# Patient Record
Sex: MALE | Race: OTHER | NOT HISPANIC OR LATINO | Employment: FULL TIME | URBAN - METROPOLITAN AREA
[De-identification: names, ages, dates, MRNs, and addresses within clinical notes are randomized per-mention and may not be internally consistent; named-entity substitution may affect disease eponyms.]

---

## 2021-01-26 ENCOUNTER — OFFICE VISIT (OUTPATIENT)
Dept: OBGYN CLINIC | Facility: CLINIC | Age: 36
End: 2021-01-26
Payer: COMMERCIAL

## 2021-01-26 VITALS
DIASTOLIC BLOOD PRESSURE: 77 MMHG | BODY MASS INDEX: 28.6 KG/M2 | WEIGHT: 230 LBS | HEART RATE: 63 BPM | HEIGHT: 75 IN | SYSTOLIC BLOOD PRESSURE: 131 MMHG

## 2021-01-26 DIAGNOSIS — M24.812 INTERNAL DERANGEMENT OF LEFT SHOULDER: Primary | ICD-10-CM

## 2021-01-26 PROCEDURE — 99203 OFFICE O/P NEW LOW 30 MIN: CPT | Performed by: ORTHOPAEDIC SURGERY

## 2021-01-26 NOTE — PROGRESS NOTES
Patient Name:  Hilaria Brown  MRN:  [id number]    Assessment & Plan     Left shoulder discomfort after dislocation 9/21/20  Possible SLAP tear  1  Referral to physical therapy  2  Activities as tolerated with modification avoid pain  3  Briefly discussed MR arthrogram left shoulder if pain persists after 4-6 weeks of physical therapy  Patient may contact the office and schedule MRI over the phone  Chief Complaint     Left Shoulder pain  History of the Present Illness     Aroldo Bond is a 37 y o  male who reports to the office today for initial evaluation his left shoulder  Patient dislocated his left shoulder on 9/21/20 after he fell down steps  The shoulder was close reduced in [place] where he was living in an urgent care  He was advised to follow up with Orthopedics however he states he felt pretty good  He still notes mild persistent soreness and weakness in the shoulder  Soreness localized primarily to the lateral aspect of the shoulder  It is worse with reaching behind his back  He denies any instability  He takes nothing for pain  No numbness or tingling  No fevers or chills  Physical Exam     /77   Pulse 63   Ht 6' 3" (1 905 m)   Wt 104 kg (230 lb)   BMI 28 75 kg/m²      Left shoulder: No tenderness to palpation  PROM is , ER-abd 90, IR-abd 70  Neer impingement sign is positive  Lino impingement sign is negative  Negative empty can test  Positive Speed's test  Positive cross body adduction test  Positive Windsor's test     Eyes:  Anicteric sclerae  Neck:  Supple  Lungs:  Normal respiratory effort  Cardiovascular:  Capillary refill is less than 2 seconds  Skin:  Intact without erythema  Neurologic:  Sensation grossly intact to light touch  Psychiatric:  Mood and affect are appropriate  History reviewed  No pertinent past medical history  History reviewed  No pertinent surgical history      No Known Allergies    No current outpatient medications on file prior to visit  No current facility-administered medications on file prior to visit  Social History     Tobacco Use    Smoking status: Never Smoker    Smokeless tobacco: Never Used   Substance Use Topics    Alcohol use: Not on file    Drug use: Not on file       History reviewed  No pertinent family history  Review of Systems     As stated in the HPI  All other systems were reviewed and are negative        Scribe Attestation    I,:  Randal Nelson PA-C am acting as a scribe while in the presence of the attending physician :       I,:  Timothy Bucio MD personally performed the services described in this documentation    as scribed in my presence :

## 2021-02-01 ENCOUNTER — APPOINTMENT (OUTPATIENT)
Dept: PHYSICAL THERAPY | Facility: CLINIC | Age: 36
End: 2021-02-01
Payer: COMMERCIAL

## 2021-02-04 ENCOUNTER — EVALUATION (OUTPATIENT)
Dept: PHYSICAL THERAPY | Facility: CLINIC | Age: 36
End: 2021-02-04
Payer: COMMERCIAL

## 2021-02-04 DIAGNOSIS — M24.812 INTERNAL DERANGEMENT OF SHOULDER, LEFT: Primary | ICD-10-CM

## 2021-02-04 PROCEDURE — 97161 PT EVAL LOW COMPLEX 20 MIN: CPT | Performed by: PHYSICAL THERAPIST

## 2021-02-04 NOTE — PROGRESS NOTES
PT Evaluation     Today's date: 2021  Patient name: Cassius Butterfield  : 1985  MRN: [id number]  Referring provider: Frannie Huerta PA-C  Dx: No diagnosis found  Assessment  Assessment details: Aroldo Asafpresents with signs and symptoms consistent with Internal derangement of shoulder, left  (primary encounter diagnosis), with loss of range of motion, strength and spinal stabilization  Presents with high reactivity  Cassius Butterfield would benefit with physical therapy to address these impairments to return to prior level of function  Impairments: abnormal or restricted ROM, activity intolerance, impaired physical strength, lacks appropriate home exercise program and pain with function  Understanding of Dx/Px/POC: good   Prognosis: good    Goals  STG  Initiate HEP  Restore full AROM in 3 weeks  LTG  Independent with HEP  Able to lift 10 lbs overhead in 6 weeks  FOTO > 65 in 6 weeks      Plan  Planned therapy interventions: joint mobilization, manual therapy, neuromuscular re-education, strengthening, stretching, therapeutic exercise and home exercise program  Frequency: 2x week  Duration in visits: 12  Duration in weeks: 6  Treatment plan discussed with: patient        Subjective Evaluation    History of Present Illness  Mechanism of injury: Patient reports falling down a flight of stairs about 10/1/20 and dislocating his left shoulder  He went to the ER and was relocated in 2-3 hours              Not a recurrent problem   Pain  Current pain ratin  At best pain ratin  At worst pain rating: 3  Location: left shoulder  Quality: dull ache  Relieving factors: rest  Aggravating factors: lifting and overhead activity    Treatments  Current treatment: physical therapy  Patient Goals  Patient goals for therapy: decreased pain, increased motion, increased strength, independence with ADLs/IADLs and return to sport/leisure activities          Objective     Active Range of Motion   Left Shoulder Flexion: 175 degrees   Abduction: 170 degrees   External rotation 0°: 70 degrees   Internal rotation 0°: 65 degrees     Right Shoulder   Flexion: 180 degrees   Abduction: 180 degrees   External rotation 0°: 80 degrees   Internal rotation 0°: 70 degrees     Strength/Myotome Testing     Left Shoulder     Planes of Motion   Flexion: 3+   Abduction: 3+   External rotation at 0°: 3+   Internal rotation at 0°: 4     Right Shoulder     Planes of Motion   Flexion: 5   Abduction: 5   External rotation at 0°: 5   Internal rotation at 0°: 5       Flowsheet Rows      Most Recent Value   PT/OT G-Codes   Current Score  21   Projected Score  65             Precautions: Left sh dislocation      Manuals                                                                 Neuro Re-Ed                                                                                                        Ther Ex                                                                                                                     Ther Activity                                       Gait Training                                       Modalities

## 2021-02-08 ENCOUNTER — OFFICE VISIT (OUTPATIENT)
Dept: PHYSICAL THERAPY | Facility: CLINIC | Age: 36
End: 2021-02-08
Payer: COMMERCIAL

## 2021-02-08 DIAGNOSIS — M24.812 INTERNAL DERANGEMENT OF SHOULDER, LEFT: Primary | ICD-10-CM

## 2021-02-08 PROCEDURE — 97112 NEUROMUSCULAR REEDUCATION: CPT | Performed by: PHYSICAL THERAPIST

## 2021-02-08 PROCEDURE — 97110 THERAPEUTIC EXERCISES: CPT | Performed by: PHYSICAL THERAPIST

## 2021-02-08 NOTE — PROGRESS NOTES
Daily Note     Today's date: 2021  Patient name: Itz Antoine  : 1985  MRN: [id number]  Referring provider: Avel Sims PA-C  Dx:   Encounter Diagnosis     ICD-10-CM    1  Internal derangement of shoulder, left  M24 812                   Subjective: My left shoulder is sore from new exercise  Objective: See treatment diary below      Assessment: Tolerated treatment well  Patient demonstrated fatigue post treatment and exhibited good technique with therapeutic exercises      Plan: Continue per plan of care        Precautions: Left sh dislocation      Manuals                                                                 Neuro Re-Ed             Scapular stab w ball on wall 2x20            Neurac Scap stab w kneel 2x5            Neurac sh flex kneel 2x5                                                                Ther Ex             TB reese Blue 20x            TB sh Ext Blue 20x            TB push outs Blue 20x                                                                             Ther Activity                                       Gait Training                                       Modalities

## 2021-02-09 ENCOUNTER — APPOINTMENT (OUTPATIENT)
Dept: PHYSICAL THERAPY | Facility: CLINIC | Age: 36
End: 2021-02-09
Payer: COMMERCIAL

## 2021-02-11 ENCOUNTER — OFFICE VISIT (OUTPATIENT)
Dept: PHYSICAL THERAPY | Facility: CLINIC | Age: 36
End: 2021-02-11
Payer: COMMERCIAL

## 2021-02-11 DIAGNOSIS — M24.812 INTERNAL DERANGEMENT OF SHOULDER, LEFT: Primary | ICD-10-CM

## 2021-02-11 PROCEDURE — 97110 THERAPEUTIC EXERCISES: CPT | Performed by: PHYSICAL THERAPIST

## 2021-02-11 PROCEDURE — 97112 NEUROMUSCULAR REEDUCATION: CPT | Performed by: PHYSICAL THERAPIST

## 2021-02-11 NOTE — PROGRESS NOTES
Daily Note     Today's date: 2021  Patient name: Madiha Kim  : 1985  MRN: [id number]  Referring provider: Lucio Weber PA-C  Dx:   Encounter Diagnosis     ICD-10-CM    1  Internal derangement of shoulder, left  M24 812                   Subjective: My shoulder is feeling stronger    Objective: See treatment diary below      Assessment: Tolerated treatment well  Patient demonstrated fatigue post treatment and exhibited good technique with therapeutic exercises      Plan: Continue per plan of care        Precautions: Left sh dislocation      Manuals                                                                 Neuro Re-Ed            Scapular stab w ball on wall 2x20 20x bosu           Neurac Scap stab w kneel 2x5 2x5           Neurac sh flex kneel 2x5 2x5                                                               Ther Ex            TB reese Blue 20x 20x           TB sh Ext Blue 20x 20x           TB push outs Blue 20x 20x           blanca lo to hi  #8 20x           [place] hi to lo  #10 20x                                                  Ther Activity                                       Gait Training                                       Modalities

## 2021-02-16 ENCOUNTER — OFFICE VISIT (OUTPATIENT)
Dept: PHYSICAL THERAPY | Facility: CLINIC | Age: 36
End: 2021-02-16
Payer: COMMERCIAL

## 2021-02-16 DIAGNOSIS — M24.812 INTERNAL DERANGEMENT OF SHOULDER, LEFT: Primary | ICD-10-CM

## 2021-02-16 PROCEDURE — 97110 THERAPEUTIC EXERCISES: CPT | Performed by: PHYSICAL THERAPIST

## 2021-02-16 PROCEDURE — 97112 NEUROMUSCULAR REEDUCATION: CPT | Performed by: PHYSICAL THERAPIST

## 2021-02-16 NOTE — PROGRESS NOTES
Daily Note     Today's date: 2021  Patient name: Itz Antoine  : 1985  MRN: [id number]  Referring provider: Avel Sims PA-C  Dx:   Encounter Diagnosis     ICD-10-CM    1  Internal derangement of shoulder, left  M24 812                   Subjective: My shoulder is feeling stronger  Objective: See treatment diary below      Assessment: Tolerated treatment well  Patient demonstrated fatigue post treatment and exhibited good technique with therapeutic exercises      Plan: Continue per plan of care        Precautions: Left sh dislocation      Manuals                                                                 Neuro Re-Ed           Scapular stab w ball on wall 2x20 20x bosu 20x          Neurac Scap stab w kneel 2x5 2x5 2x5          Neurac sh flex kneel 2x5 2x5 2x5                                                              Ther Ex           TB reese Blue 20x 20x 20x          TB sh Ext Blue 20x 20x 20x          TB push outs Blue 20x 20x 20x          rodriguez lo to hi  #8 20x 20x          [place] hi to lo  #10 20x 20x          tricep dip   2x5          Reverse pushup   2x5                       Ther Activity                                       Gait Training                                       Modalities KYLIE:'[id number]:[id number]'

## 2021-02-18 ENCOUNTER — APPOINTMENT (OUTPATIENT)
Dept: PHYSICAL THERAPY | Facility: CLINIC | Age: 36
End: 2021-02-18
Payer: COMMERCIAL

## 2021-02-19 ENCOUNTER — OFFICE VISIT (OUTPATIENT)
Dept: PHYSICAL THERAPY | Facility: CLINIC | Age: 36
End: 2021-02-19
Payer: COMMERCIAL

## 2021-02-19 DIAGNOSIS — M24.812 INTERNAL DERANGEMENT OF SHOULDER, LEFT: Primary | ICD-10-CM

## 2021-02-19 PROCEDURE — 97112 NEUROMUSCULAR REEDUCATION: CPT | Performed by: PHYSICAL THERAPIST

## 2021-02-19 PROCEDURE — 97110 THERAPEUTIC EXERCISES: CPT | Performed by: PHYSICAL THERAPIST

## 2021-02-19 NOTE — PROGRESS NOTES
Daily Note     Today's date: 2021  Patient name: Dwayne Reyes  : 1985  MRN: [id number]  Referring provider: Esthela Fowler PA-C  Dx:   Encounter Diagnosis     ICD-10-CM    1  Internal derangement of shoulder, left  M24 812                   Subjective: My shoulder is getting stronger  Objective: See treatment diary below      Assessment: Tolerated treatment well  Patient demonstrated fatigue post treatment and exhibited good technique with therapeutic exercises      Plan: Continue per plan of care        Precautions: Left sh dislocation      Manuals                                                                 Neuro Re-Ed          Scapular stab w ball on wall 2x20 20x bosu 20x 20x         Neurac Scap stab w kneel 2x5 2x5 2x5 2x5         Neurac sh flex kneel 2x5 2x5 2x5 2x5                                                             Ther Ex           TB reese Blue 20x 20x 20x 20x         TB sh Ext Blue 20x 20x 20x 20x         TB push outs Blue 20x 20x 20x 20x         rodriguez lo to hi  #8 20x 20x 20x         [place] hi to lo  #10 20x 20x 20x         tricep dip   2x5 2x7         Reverse pushup   2x5 2x7                      Ther Activity                                       Gait Training                                       Modalities

## 2021-02-22 ENCOUNTER — TELEPHONE (OUTPATIENT)
Dept: UROLOGY | Facility: MEDICAL CENTER | Age: 36
End: 2021-02-22

## 2021-02-22 NOTE — TELEPHONE ENCOUNTER
Npt calling with continual left testicular pain x 2 weeks no testing no medical treatment,please contact pt for time frame appointment

## 2021-02-22 NOTE — TELEPHONE ENCOUNTER
Complaint/Diagnosis:Left Testicular Pain    Insurance:[facility] PPO    History of Cancer:no    Previous urologist:no    Outside testing/where:no    If yes,what kind:no    Records requested/where:no    Preferred location:[place]

## 2021-02-23 ENCOUNTER — OFFICE VISIT (OUTPATIENT)
Dept: PHYSICAL THERAPY | Facility: CLINIC | Age: 36
End: 2021-02-23
Payer: COMMERCIAL

## 2021-02-23 DIAGNOSIS — M24.812 INTERNAL DERANGEMENT OF SHOULDER, LEFT: Primary | ICD-10-CM

## 2021-02-23 PROCEDURE — 97112 NEUROMUSCULAR REEDUCATION: CPT

## 2021-02-23 PROCEDURE — 97110 THERAPEUTIC EXERCISES: CPT

## 2021-02-23 NOTE — TELEPHONE ENCOUNTER
Called and spoke with patient at this time  He reports pain in left testicle times 2 weeks  No lumps noted, no fever or chills  He does stat he thinks his left side is a bit bigger than the right  He is unsure if it was always like this or if he is just noticing it now  Advised our next available appt is 3/31/21 at [facility]  He accepted this appt and it was scheduled and placed on wait list      Advised he see a PCP or urgent care in the meantime and have a scrotal US done  Patient was provided with the closest [facility] to him, their address and phone number  He reports he does have an appt with a [place] Urologist but would like to have care at [facility]  Advised he is free to choose who he sees for his care  Advised if there are concerning results on US done via [facility] they will likely consult us for a sooner appt  He reports he will go to a [facility] soon and have US done  He verbalized understanding of conversation

## 2021-02-23 NOTE — PROGRESS NOTES
Daily Note     Today's date: 2021  Patient name: Herminio Delarosa  : 1985  MRN: [id number]  Referring provider: Junior Riddle PA-C  Dx:   Encounter Diagnosis     ICD-10-CM    1  Internal derangement of shoulder, left  M24 812        Start Time: 1645          Subjective: Patient reports continued improvement with R shoulder  Objective: See treatment diary below      Assessment: Tolerated treatment well  Patient exhibited good technique with therapeutic exercises and would benefit from continued PT      Plan: Progress treatment as tolerated         Precautions: Left sh dislocation      Manuals                                                                 Neuro Re-Ed         Scapular stab w ball on wall 2x20 20x bosu 20x 20x 20x        Neurac Scap stab w kneel 2x5 2x5 2x5 2x5 ---        Neurac sh flex kneel 2x5 2x5 2x5 2x5 ---        scap stabs on inverted BOSU    --- 20x                                               Ther Ex           TB reese Blue 20x 20x 20x 20x 20x        TB sh Ext Blue 20x 20x 20x 20x 20x        TB push outs Blue 20x 20x 20x 20x 20x        rodriguez lo to hi  #8 20x 20x 20x ---        [place] hi to lo  #10 20x 20x 20x ---        tricep dip   2x5 2x7 2x7 reps         Reverse pushup   2x5 2x7 2x7 reps         TB ER     Gr tubing x 20 reps         Ther Activity                                       Gait Training                                       Modalities

## 2021-02-24 ENCOUNTER — OFFICE VISIT (OUTPATIENT)
Dept: URGENT CARE | Facility: CLINIC | Age: 36
End: 2021-02-24
Payer: COMMERCIAL

## 2021-02-24 VITALS
OXYGEN SATURATION: 98 % | HEART RATE: 67 BPM | WEIGHT: 233 LBS | SYSTOLIC BLOOD PRESSURE: 119 MMHG | BODY MASS INDEX: 29.9 KG/M2 | DIASTOLIC BLOOD PRESSURE: 74 MMHG | HEIGHT: 74 IN | TEMPERATURE: 97.5 F | RESPIRATION RATE: 18 BRPM

## 2021-02-24 DIAGNOSIS — N50.812 LEFT TESTICULAR PAIN: Primary | ICD-10-CM

## 2021-02-24 LAB
SL AMB  POCT GLUCOSE, UA: NEGATIVE
SL AMB LEUKOCYTE ESTERASE,UA: NEGATIVE
SL AMB POCT BILIRUBIN,UA: NEGATIVE
SL AMB POCT BLOOD,UA: NEGATIVE
SL AMB POCT CLARITY,UA: CLEAR
SL AMB POCT COLOR,UA: NORMAL
SL AMB POCT KETONES,UA: NEGATIVE
SL AMB POCT NITRITE,UA: NEGATIVE
SL AMB POCT PH,UA: 6.5
SL AMB POCT SPECIFIC GRAVITY,UA: 1.02
SL AMB POCT URINE PROTEIN: NEGATIVE
SL AMB POCT UROBILINOGEN: 0.2

## 2021-02-24 PROCEDURE — 87491 CHLMYD TRACH DNA AMP PROBE: CPT | Performed by: PHYSICIAN ASSISTANT

## 2021-02-24 PROCEDURE — 87591 N.GONORRHOEAE DNA AMP PROB: CPT | Performed by: PHYSICIAN ASSISTANT

## 2021-02-24 PROCEDURE — 81002 URINALYSIS NONAUTO W/O SCOPE: CPT | Performed by: PHYSICIAN ASSISTANT

## 2021-02-24 PROCEDURE — 99203 OFFICE O/P NEW LOW 30 MIN: CPT | Performed by: PHYSICIAN ASSISTANT

## 2021-02-24 NOTE — PATIENT INSTRUCTIONS
Apply ice to your scrotum for discomfort relief  Take ibuprofen for discomfort  Follow up with a urologist in 3-5 days  Proceed to the ER if symptoms worsen

## 2021-02-24 NOTE — PROGRESS NOTES
[facility]        NAME: Bautista Dominique is a 37 y o  male  : 1985    MRN: [id number]  DATE: 2021  TIME: 5:11 PM    Assessment and Plan   Left testicular pain [N50 812]  1  Left testicular pain  POCT urine dip    Chlamydia/GC amplified DNA by PCR     Patient Instructions     Apply ice to your scrotum for discomfort relief  Take ibuprofen for discomfort  Follow up with a urologist in 3-5 days  Proceed to the ER if symptoms worsen  Chief Complaint     Chief Complaint   Patient presents with    Testicle Pain     has appointment with Urology at [place] 3/31/21  has left testicular pain       History of Present Illness       36 y/o male with c/o left testicular pain x 1 week  Pain has been constant since its onset and gradually increasing in severity  It is described as an aching or heaviness  Pain is felt throughout the testicle but most prominent over posterior aspect  It does not radiate  No associated F/C/S, swelling, redness, or bruising  No known injury  No prior occurrence  Has not been sexually active in approximately 6 months and has been in a monogamous relationship with this partner x 2 years  No penile discharge, swelling, or pain  Denies urinary hesitancy, dysuria, frequency, urgency, or hematuria  Review of Systems   Review of Systems   Constitutional: Negative for chills, diaphoresis and fever  Gastrointestinal: Negative for abdominal pain, nausea and vomiting  Genitourinary: Positive for testicular pain  Negative for decreased urine volume, difficulty urinating, discharge, dysuria, flank pain, frequency, hematuria, penile pain, penile swelling, scrotal swelling and urgency  Current Medications     No current outpatient medications on file      Current Allergies     Allergies as of 2021    (No Known Allergies)            The following portions of the patient's history were reviewed and updated as appropriate: allergies, current medications, past family history, past medical history, past social history, past surgical history and problem list      History reviewed  No pertinent past medical history  History reviewed  No pertinent surgical history  History reviewed  No pertinent family history  Medications have been verified  Objective   /74   Pulse 67   Temp 97 5 °F (36 4 °C)   Resp 18   Ht 6' 2" (1 88 m)   Wt 106 kg (233 lb)   SpO2 98%   BMI 29 92 kg/m²   No LMP for male patient  Urine Dip:  Component 2/24/21 5:10 PM   LEUKOCYTE ESTERASE,UA Negative    NITRITE,UA Negative    SL AMB POCT UROBILINOGEN 0 2    POCT URINE PROTEIN Negative     PH,UA 6 5    BLOOD,UA Negative    SPECIFIC GRAVITY,UA 1 020    KETONES,UA Negative    BILIRUBIN,UA Negative    GLUCOSE, UA Negative     COLOR,UA Straw    CLARITY,UA Clear        Physical Exam     Physical Exam  Vitals signs and nursing note reviewed  Chaperone present: Chaperone declined  Constitutional:       General: He is not in acute distress  Appearance: He is well-developed  He is not ill-appearing or diaphoretic  HENT:      Head: Normocephalic and atraumatic  Eyes:      General: Lids are normal          Right eye: No discharge  Left eye: No discharge  Conjunctiva/sclera: Conjunctivae normal    Abdominal:      General: Abdomen is flat  Palpations: Abdomen is soft  Tenderness: There is no abdominal tenderness  Genitourinary:     Penis: Normal        Scrotum/Testes: Normal  Cremasteric reflex is present  Left: Mass, tenderness, swelling, testicular hydrocele or varicocele not present  Epididymis:      Left: Normal    Skin:     General: Skin is warm and dry  Neurological:      General: No focal deficit present  Mental Status: He is alert  He is not disoriented  Coordination: Coordination is intact  Gait: Gait normal    Psychiatric:         Attention and Perception: Attention normal          Behavior: Behavior is cooperative

## 2021-02-25 ENCOUNTER — OFFICE VISIT (OUTPATIENT)
Dept: PHYSICAL THERAPY | Facility: CLINIC | Age: 36
End: 2021-02-25
Payer: COMMERCIAL

## 2021-02-25 DIAGNOSIS — M24.812 INTERNAL DERANGEMENT OF SHOULDER, LEFT: Primary | ICD-10-CM

## 2021-02-25 LAB
C TRACH DNA SPEC QL NAA+PROBE: NEGATIVE
N GONORRHOEA DNA SPEC QL NAA+PROBE: NEGATIVE

## 2021-02-25 PROCEDURE — 97110 THERAPEUTIC EXERCISES: CPT | Performed by: PHYSICAL THERAPIST

## 2021-02-25 PROCEDURE — 97112 NEUROMUSCULAR REEDUCATION: CPT | Performed by: PHYSICAL THERAPIST

## 2021-02-25 NOTE — TELEPHONE ENCOUNTER
Please advise currently scheduled appt is appropriate  Can add to wait list  Should also request scrotal US from PCP or urgent care prior to appt with urology

## 2021-02-25 NOTE — TELEPHONE ENCOUNTER
Patient is calling back as went to urgent care and was told to see a urologist  He is calling back again to set up appointment as he called the facility in the first place

## 2021-02-25 NOTE — TELEPHONE ENCOUNTER
Per previous notes, appt already scheduled  This is unfortunately the next available new patient appt  Patient can be placed on wait list  Of note urgent care did not perform a scrotal US  GC/Chlamydia was negative  Please review urgent care notes in epic and advise if sooner appt warranted

## 2021-02-25 NOTE — TELEPHONE ENCOUNTER
Sounds okay if his symptoms get worse we can try to squeeze of min with knees at some point  Would be good if his family doctor could order an ultrasound

## 2021-02-25 NOTE — PROGRESS NOTES
Daily Note     Today's date: 2021  Patient name: Shayy Cohen  : 1985  MRN: [id number]  Referring provider: Raymon Rodney PA-C  Dx: No diagnosis found  Subjective: My shoulder feels stronger  Objective: See treatment diary below      Assessment: Tolerated treatment well  Patient demonstrated fatigue post treatment and exhibited good technique with therapeutic exercises      Plan: Continue per plan of care        Precautions: Left sh dislocation      Manuals                                                                 Neuro Re-Ed        Scapular stab w ball on wall 2x20 20x bosu 20x 20x 20x 20x       Neurac Scap stab w kneel 2x5 2x5 2x5 2x5 ---        Neurac sh flex kneel 2x5 2x5 2x5 2x5 ---        scap stabs on inverted BOSU    --- 20x 20x                                              Ther Ex           TB reese Blue 20x 20x 20x 20x 20x 20x       TB sh Ext Blue 20x 20x 20x 20x 20x 20x       TB push outs Blue 20x 20x 20x 20x 20x 20x       rodriguez lo to hi  #8 20x 20x 20x --- #12 20x       [place] hi to lo  #10 20x 20x 20x --- #12 20x       tricep dip   2x5 2x7 2x7 reps         Reverse pushup   2x5 2x7 2x7 reps         TB ER     Gr tubing x 20 reps  20x       Ther Activity                                       Gait Training                                       Modalities

## 2021-03-01 ENCOUNTER — OFFICE VISIT (OUTPATIENT)
Dept: URGENT CARE | Facility: CLINIC | Age: 36
End: 2021-03-01
Payer: COMMERCIAL

## 2021-03-01 VITALS
OXYGEN SATURATION: 100 % | RESPIRATION RATE: 18 BRPM | WEIGHT: 229 LBS | HEIGHT: 75 IN | SYSTOLIC BLOOD PRESSURE: 132 MMHG | TEMPERATURE: 97 F | BODY MASS INDEX: 28.47 KG/M2 | DIASTOLIC BLOOD PRESSURE: 80 MMHG | HEART RATE: 71 BPM

## 2021-03-01 DIAGNOSIS — R51.9 ACUTE NONINTRACTABLE HEADACHE, UNSPECIFIED HEADACHE TYPE: Primary | ICD-10-CM

## 2021-03-01 PROCEDURE — 3725F SCREEN DEPRESSION PERFORMED: CPT | Performed by: PHYSICIAN ASSISTANT

## 2021-03-01 PROCEDURE — 99213 OFFICE O/P EST LOW 20 MIN: CPT | Performed by: PHYSICIAN ASSISTANT

## 2021-03-01 NOTE — PROGRESS NOTES
[facility]      NAME: Herminio Delarosa is a 37 y o  male  : 1985    MRN: [id number]  DATE: 2021  TIME: 2:25 PM    Assessment and Plan   Acute nonintractable headache, unspecified headache type [R51 9]  1  Acute nonintractable headache, unspecified headache type       Patient Instructions   Headache likely from dehydration or mild concussion if anything  Increase fluids, ibuprofen as needed for headache  If symptoms do not improve or worsen to ER  Follow up with PCP in 3-5 days  Proceed to  ER if symptoms worsen  Chief Complaint     Chief Complaint   Patient presents with    Head Injury     PATIENT STATED THAT HE THAT HE WAS DRINKING THROUGH UP WOKE UP WITH SORENESS ON RIGHT SIDE OF THE HEAD HE DONT REMEMBER HITTING HIS HEAD  THIS HAPPENED FRIDAY NIGHT INTO SATURDAY NOW HE IS FEELING PRESSURE PAIN IN NECK         History of Present Illness       Aroldo is a 72-year-old male who presents to clinic complaining headache x3 days  States that Friday night he was drinking heavily when he threw and fell asleep on the bathroom floor when he woke up Saturday he had had pressure and pain on the right posterior side of his head  He states that pain is still present today  He states that the right back of his head behind ears also tender but he does not feel any lump or wound  He does not remember hitting his head however he was intoxicated  Denies any vision changes, photophobia, nausea, vomiting or balance problems currently  Review of Systems   Review of Systems   Constitutional: Negative  Eyes: Negative for photophobia and visual disturbance  Gastrointestinal: Negative for nausea and vomiting  Musculoskeletal: Negative for gait problem, neck pain and neck stiffness  Neurological: Positive for headaches  Negative for dizziness, seizures, speech difficulty and light-headedness  Current Medications     No current outpatient medications on file      Current Allergies     Allergies as of 03/01/2021    (No Known Allergies)            The following portions of the patient's history were reviewed and updated as appropriate: allergies, current medications, past family history, past medical history, past social history, past surgical history and problem list      History reviewed  No pertinent past medical history  History reviewed  No pertinent surgical history  History reviewed  No pertinent family history  Medications have been verified  Objective   /80 (BP Location: Right arm, Patient Position: Sitting, Cuff Size: Standard)   Pulse 71   Temp (!) 97 °F (36 1 °C) (Tympanic)   Resp 18   Ht 6' 3" (1 905 m)   Wt 104 kg (229 lb)   SpO2 100%   BMI 28 62 kg/m²   No LMP for male patient  Physical Exam     Physical Exam  Vitals signs and nursing note reviewed  Constitutional:       General: He is not in acute distress  Appearance: Normal appearance  He is not ill-appearing  HENT:      Head: Normocephalic and atraumatic  No raccoon eyes, Powers's sign, abrasion, contusion or laceration  Right Ear: Hearing, tympanic membrane, ear canal and external ear normal       Left Ear: Hearing, tympanic membrane, ear canal and external ear normal    Eyes:      Extraocular Movements: Extraocular movements intact  Conjunctiva/sclera: Conjunctivae normal       Pupils: Pupils are equal, round, and reactive to light  Cardiovascular:      Rate and Rhythm: Normal rate and regular rhythm  Heart sounds: Normal heart sounds  Pulmonary:      Effort: Pulmonary effort is normal       Breath sounds: Normal breath sounds  Neurological:      Mental Status: He is alert and oriented to person, place, and time     Psychiatric:         Mood and Affect: Mood normal          Behavior: Behavior normal

## 2021-03-02 ENCOUNTER — OFFICE VISIT (OUTPATIENT)
Dept: FAMILY MEDICINE CLINIC | Facility: CLINIC | Age: 36
End: 2021-03-02
Payer: COMMERCIAL

## 2021-03-02 ENCOUNTER — OFFICE VISIT (OUTPATIENT)
Dept: PHYSICAL THERAPY | Facility: CLINIC | Age: 36
End: 2021-03-02
Payer: COMMERCIAL

## 2021-03-02 VITALS
HEART RATE: 72 BPM | TEMPERATURE: 97.9 F | BODY MASS INDEX: 29.39 KG/M2 | WEIGHT: 229 LBS | RESPIRATION RATE: 16 BRPM | OXYGEN SATURATION: 98 % | DIASTOLIC BLOOD PRESSURE: 70 MMHG | SYSTOLIC BLOOD PRESSURE: 120 MMHG | HEIGHT: 74 IN

## 2021-03-02 DIAGNOSIS — Z13.1 SCREENING FOR DIABETES MELLITUS: ICD-10-CM

## 2021-03-02 DIAGNOSIS — N50.812 LEFT TESTICULAR PAIN: Primary | ICD-10-CM

## 2021-03-02 DIAGNOSIS — Z13.0 SCREENING FOR DEFICIENCY ANEMIA: ICD-10-CM

## 2021-03-02 DIAGNOSIS — Z13.6 SCREENING FOR CARDIOVASCULAR CONDITION: ICD-10-CM

## 2021-03-02 DIAGNOSIS — M24.812 INTERNAL DERANGEMENT OF SHOULDER, LEFT: Primary | ICD-10-CM

## 2021-03-02 DIAGNOSIS — Z11.4 SCREENING FOR HIV (HUMAN IMMUNODEFICIENCY VIRUS): ICD-10-CM

## 2021-03-02 DIAGNOSIS — N26.1 ATROPHY OF RIGHT KIDNEY: ICD-10-CM

## 2021-03-02 PROCEDURE — 99214 OFFICE O/P EST MOD 30 MIN: CPT | Performed by: NURSE PRACTITIONER

## 2021-03-02 PROCEDURE — 97110 THERAPEUTIC EXERCISES: CPT

## 2021-03-02 PROCEDURE — 97112 NEUROMUSCULAR REEDUCATION: CPT

## 2021-03-02 NOTE — PROGRESS NOTES
Assessment/Plan:    1  Left testicular pain  -     US scrotum and testicles; Future; Expected date: 03/02/2021    2  Atrophy of right kidney    3  Screening for cardiovascular condition  -     Lipid Panel with Direct LDL reflex; Future    4  Screening for diabetes mellitus  -     Comprehensive metabolic panel; Future    5  Screening for deficiency anemia  -     CBC; Future    6  Screening for HIV (human immunodeficiency virus)  -     [facility]- Human Immunodeficiency Virus 1/2 Antigen / Antibody ( Fourth Generation) with Reflex Testing; Future          BMI Counseling: Body mass index is 29 8 kg/m²  Discussed the patient's BMI with him  The BMI is above normal  Nutrition recommendations include reducing portion sizes, decreasing overall calorie intake, 3-5 servings of fruits/vegetables daily, reducing fast food intake, consuming healthier snacks, decreasing soda and/or juice intake, moderation in carbohydrate intake, increasing intake of lean protein, reducing intake of saturated fat and trans fat and reducing intake of cholesterol  Patient Instructions:  Supportive care discussed and advised  Advised to RTO for any worsening and no improvement  Follow up for no improvement and worsening of conditions  Patient advised and educated when to see immediate medical care  Return in about 1 month (around 4/2/2021) for Annual physical       Future Appointments   Date Time Provider Bianca Orta   3/2/2021  5:00 PM [place], PTA [address] [place] [address]   3/4/2021  5:00 PM [place], PTA [address] [place] [address]   3/9/2021  5:00 PM [place], PTA [address] [place] [address]   3/10/2021  2:45 PM Ervin Deutsch PA-C [facility]   3/11/2021  5:00 PM [facility] ([place], [place]), [address] [place] [address]   3/16/2021  5:00 PM [place], PTA [address] [place] [address]   4/1/2021 11:30 AM WILLIE Velasquez [facility]           Subjective:      Patient ID: Kaye Dejesus is a 37 y o  male      Chief Complaint   Patient presents with    Follow-up     follow up post carenow visit due to scrotal pain, patient also requesting a referral for scrotal U/S , jloepzcma     Establish Care         Vitals:  /70   Pulse 72   Temp 97 9 °F (36 6 °C)   Resp 16   Ht 6' 1 5" (1 867 m)   Wt 104 kg (229 lb)   SpO2 98%   BMI 29 80 kg/m²     HPI    New to practice  Personal and family medical history reviewed  Denies any family and personal history of colon cancer, prostate cancer, breast cancer and pancreatic cancer  Patient stated that started with left testicular pain area about 2 weeks ago and stated that went to urgent care and did not find anything and advised to follow up with urologist  Patient stated that has appt scheduled with urologist on 03/10/2021 and needs ultrasound done before that appt and needs orders for that  Stated that pain is improving but not resolved yet  Denies any urinary symptoms  Denies any new sexual partners  Denies any falling, and heavy lifting  Denies any kind of strain  Denies any radiation of pain to groin or legs  In history stated that had abdominal MRI done in past and they found that his right kidney is smaller than left kidney and had seen nephrologist who advised him to get BMP every year but no other monitoring needed and stated that last time had blood work done more than year ago  The following portions of the patient's history were reviewed and updated as appropriate: allergies, current medications, past family history, past medical history, past social history, past surgical history and problem list       Review of Systems   Constitutional: Negative  HENT: Negative  Respiratory: Negative  Cardiovascular: Negative  Gastrointestinal: Negative  Genitourinary: Positive for testicular pain   Negative for difficulty urinating, discharge, dysuria, enuresis, flank pain, frequency, genital sores, hematuria, penile pain, penile swelling, scrotal swelling and urgency  Musculoskeletal: Negative  Skin: Negative  Neurological: Negative for dizziness and headaches  Objective:    Social History     Tobacco Use   Smoking Status Never Smoker   Smokeless Tobacco Never Used       Allergies: No Known Allergies      No current outpatient medications on file  No current facility-administered medications for this visit  Physical Exam  Exam conducted with a chaperone present Edson Angel)  Constitutional:       Appearance: Normal appearance  HENT:      Head: Normocephalic  Nose: Nose normal    Eyes:      Conjunctiva/sclera: Conjunctivae normal    Cardiovascular:      Rate and Rhythm: Normal rate and regular rhythm  Heart sounds: Normal heart sounds  Pulmonary:      Effort: Pulmonary effort is normal       Breath sounds: Normal breath sounds  Abdominal:      Hernia: There is no hernia in the left inguinal area or right inguinal area  Genitourinary:     Penis: Normal and circumcised  No tenderness, discharge, swelling or lesions  Scrotum/Testes:         Right: Mass, tenderness or swelling not present  Right testis is descended  Left: Mass, tenderness or swelling not present  Left testis is descended  Musculoskeletal: Normal range of motion  General: No swelling or tenderness  Lymphadenopathy:      Lower Body: No right inguinal adenopathy  No left inguinal adenopathy  Skin:     General: Skin is warm and dry  Findings: No rash  Neurological:      Mental Status: He is alert and oriented to person, place, and time  Psychiatric:         Mood and Affect: Mood normal          Behavior: Behavior normal          Thought Content:  Thought content normal          Judgment: Judgment normal                      WILLIE Guillermo

## 2021-03-02 NOTE — PROGRESS NOTES
Daily Note     Today's date: 3/2/2021  Patient name: Real Dear  : 1985  MRN: [id number]  Referring provider: Eva Gonsalez PA-C  Dx:   Encounter Diagnosis     ICD-10-CM    1  Internal derangement of shoulder, left  M24 812        Start Time: 1650          Subjective: My shoulder feels stronger  Objective: See treatment diary below      Assessment: Tolerated treatment well  Patient demonstrated fatigue post treatment, exhibited good technique with therapeutic exercises and would benefit from continued PT      Plan: Progress treatment as tolerated         Precautions: Left sh dislocation      Manuals                                                                 Neuro Re-Ed 2/8 2/11 2/16 2/19 2/23 2/25 3/2      Scapular stab w ball on wall 2x20 20x bosu 20x 20x 20x 20x 20/20      Neurac Scap stab w kneel 2x5 2x5 2x5 2x5 ---  --      Neurac sh flex kneel 2x5 2x5 2x5 2x5 ---  --      scap stabs on inverted BOSU    --- 20x 20x --                                             Ther Ex           TB reese Blue 20x 20x 20x 20x 20x 20x Red x 20 reps       TB sh Ext Blue 20x 20x 20x 20x 20x 20x Green TB x 20 reps       TB push outs Blue 20x 20x 20x 20x 20x 20x Red x 20 reps       rodriguez lo to hi  #8 20x 20x 20x --- #12 20x ---      [place] hi to lo  #10 20x 20x 20x --- #12 20x --      tricep dip   2x5 2x7 2x7 reps   keise tricep/biceps @#20 x 20 reps      Reverse pushup   2x5 2x7 2x7 reps   ---      TB ER     Gr tubing x 20 reps  20x Gr tubing x 20 reps       Ther Activity       5# bicep curls w/ scap retractions                                Gait Training                                       Modalities

## 2021-03-03 ENCOUNTER — HOSPITAL ENCOUNTER (OUTPATIENT)
Dept: RADIOLOGY | Facility: HOSPITAL | Age: 36
Discharge: HOME | End: 2021-03-03
Payer: COMMERCIAL

## 2021-03-03 DIAGNOSIS — N50.812 LEFT TESTICULAR PAIN: ICD-10-CM

## 2021-03-03 PROCEDURE — 76870 US EXAM SCROTUM: CPT

## 2021-03-04 ENCOUNTER — OFFICE VISIT (OUTPATIENT)
Dept: PHYSICAL THERAPY | Facility: CLINIC | Age: 36
End: 2021-03-04
Payer: COMMERCIAL

## 2021-03-04 DIAGNOSIS — M24.812 INTERNAL DERANGEMENT OF SHOULDER, LEFT: Primary | ICD-10-CM

## 2021-03-04 PROCEDURE — 97112 NEUROMUSCULAR REEDUCATION: CPT

## 2021-03-04 PROCEDURE — 97110 THERAPEUTIC EXERCISES: CPT

## 2021-03-04 NOTE — PROGRESS NOTES
Daily Note     Today's date: 3/4/2021  Patient name: Alexsander Shah  : 1985  MRN: [id number]  Referring provider: Akanksha Odom PA-C  Dx:   Encounter Diagnosis     ICD-10-CM    1  Internal derangement of shoulder, left  M24 812        Start Time: 1645          Subjective: I need a heavier band for exercises at home  Objective: See treatment diary below      Assessment: Tolerated treatment well  Patient exhibited good technique with therapeutic exercises and would benefit from continued PT  Provided patient with [employer] for HEP  Plan: Progress treatment as tolerated         Precautions: Left sh dislocation      Manuals                                                                 Neuro Re-Ed 2/8 2/11 2/16 2/19 2/23 2/25 3/4      Scapular stab w ball on wall 2x20 20x bosu 20x 20x 20x 20x 20/20      Neurac Scap stab w kneel 2x5 2x5 2x5 2x5 ---  --      Neurac sh flex kneel 2x5 2x5 2x5 2x5 ---  --      scap stabs on inverted BOSU    --- 20x 20x --                                             Ther Ex           TB reese Blue 20x 20x 20x 20x 20x 20x Red x 20 reps       TB sh Ext Blue 20x 20x 20x 20x 20x 20x Green TB x 20 reps       TB push outs Blue 20x 20x 20x 20x 20x 20x Red x 20 reps       blanca lo to hi  #8 20x 20x 20x --- #12 20x @#11 x 30 reps       Blanca hi to lo  #10 20x 20x 20x --- #12 20x @#10 x 30 reps       tricep dip   2x5 2x7 2x7 reps   keise tricep/biceps @#20 x 20 reps      Reverse pushup   2x5 2x7 2x7 reps   2x10 reps       TB ER     Gr tubing x 20 reps  20x Gr tubing x 20 reps       Ther Activity       5# bicep curls w/ scap retractions                                Gait Training                                       Modalities

## 2021-03-06 LAB
ALBUMIN SERPL-MCNC: 4.8 G/DL (ref 4–5)
ALBUMIN/GLOB SERPL: 2.3 {RATIO} (ref 1.2–2.2)
ALP SERPL-CCNC: 58 IU/L (ref 39–117)
ALT SERPL-CCNC: 33 IU/L (ref 0–44)
AST SERPL-CCNC: 24 IU/L (ref 0–40)
BASOPHILS # BLD AUTO: 0 X10E3/UL (ref 0–0.2)
BASOPHILS NFR BLD AUTO: 0 %
BILIRUB SERPL-MCNC: 0.3 MG/DL (ref 0–1.2)
BUN SERPL-MCNC: 15 MG/DL (ref 6–20)
BUN/CREAT SERPL: 16 (ref 9–20)
CALCIUM SERPL-MCNC: 9.1 MG/DL (ref 8.7–10.2)
CHLORIDE SERPL-SCNC: 106 MMOL/L (ref 96–106)
CHOLEST SERPL-MCNC: 160 MG/DL (ref 100–199)
CHOLEST/HDLC SERPL: 5 RATIO (ref 0–5)
CO2 SERPL-SCNC: 23 MMOL/L (ref 20–29)
CREAT SERPL-MCNC: 0.94 MG/DL (ref 0.76–1.27)
EOSINOPHIL # BLD AUTO: 0.1 X10E3/UL (ref 0–0.4)
EOSINOPHIL NFR BLD AUTO: 2 %
ERYTHROCYTE [DISTWIDTH] IN BLOOD BY AUTOMATED COUNT: 13.9 % (ref 11.6–15.4)
GLOBULIN SER-MCNC: 2.1 G/DL (ref 1.5–4.5)
GLUCOSE SERPL-MCNC: 87 MG/DL (ref 65–99)
HCT VFR BLD AUTO: 41.5 % (ref 37.5–51)
HDLC SERPL-MCNC: 32 MG/DL
HGB BLD-MCNC: 14.1 G/DL (ref 13–17.7)
HIV 1+2 AB+HIV1 P24 AG SERPL QL IA: NON REACTIVE
IMM GRANULOCYTES # BLD: 0 X10E3/UL (ref 0–0.1)
IMM GRANULOCYTES NFR BLD: 0 %
LDLC SERPL CALC-MCNC: 109 MG/DL (ref 0–99)
LYMPHOCYTES # BLD AUTO: 1.5 X10E3/UL (ref 0.7–3.1)
LYMPHOCYTES NFR BLD AUTO: 31 %
MCH RBC QN AUTO: 29.6 PG (ref 26.6–33)
MCHC RBC AUTO-ENTMCNC: 34 G/DL (ref 31.5–35.7)
MCV RBC AUTO: 87 FL (ref 79–97)
MICRODELETION SYND BLD/T FISH: NORMAL
MONOCYTES # BLD AUTO: 0.4 X10E3/UL (ref 0.1–0.9)
MONOCYTES NFR BLD AUTO: 9 %
NEUTROPHILS # BLD AUTO: 2.8 X10E3/UL (ref 1.4–7)
NEUTROPHILS NFR BLD AUTO: 58 %
PLATELET # BLD AUTO: 237 X10E3/UL (ref 150–450)
POTASSIUM SERPL-SCNC: 4.4 MMOL/L (ref 3.5–5.2)
PROT SERPL-MCNC: 6.9 G/DL (ref 6–8.5)
RBC # BLD AUTO: 4.77 X10E6/UL (ref 4.14–5.8)
SL AMB EGFR AFRICAN AMERICAN: 123 ML/MIN/1.73
SL AMB EGFR NON AFRICAN AMERICAN: 106 ML/MIN/1.73
SL AMB VLDL CHOLESTEROL CALC: 19 MG/DL (ref 5–40)
SODIUM SERPL-SCNC: 141 MMOL/L (ref 134–144)
TRIGL SERPL-MCNC: 101 MG/DL (ref 0–149)
WBC # BLD AUTO: 4.8 X10E3/UL (ref 3.4–10.8)

## 2021-03-09 ENCOUNTER — OFFICE VISIT (OUTPATIENT)
Dept: PHYSICAL THERAPY | Facility: CLINIC | Age: 36
End: 2021-03-09
Payer: COMMERCIAL

## 2021-03-09 DIAGNOSIS — M24.812 INTERNAL DERANGEMENT OF SHOULDER, LEFT: Primary | ICD-10-CM

## 2021-03-09 PROCEDURE — 97110 THERAPEUTIC EXERCISES: CPT

## 2021-03-09 PROCEDURE — 97112 NEUROMUSCULAR REEDUCATION: CPT

## 2021-03-09 NOTE — PROGRESS NOTES
Daily Note     Today's date: 3/9/2021  Patient name: Gladis Kauffman  : 1985  MRN: [id number]  Referring provider: Patsie Harada, PA-C  Dx:   Encounter Diagnosis     ICD-10-CM    1  Internal derangement of shoulder, left  M24 812        Start Time: 1645          Subjective: I had some shoulder pain after last visit  Objective: See treatment diary below      Assessment: Tolerated treatment fair  Patient exhibited good technique with therapeutic exercises and would benefit from continued PT  Advised patient to only perform 20 reps of exercises, but if painful to stop exercise  Plan: Progress treatment as tolerated         Precautions: Left sh dislocation      Manuals                                                                 Neuro Re-Ed 2/8 2/11 2/16 2/19 2/23 2/25 3/4 3/9     Scapular stab w ball on wall 2x20 20x bosu 20x 20x 20x 20x 20/20 20/20     Neurac Scap stab w kneel 2x5 2x5 2x5 2x5 ---  -- ---     Neurac sh flex kneel 2x5 2x5 2x5 2x5 ---  -- ---     scap stabs on inverted BOSU    --- 20x 20x -- ---                                            Ther Ex           TB reese Blue 20x 20x 20x 20x 20x 20x Red x 20 reps  Red x 20 reps      TB sh Ext Blue 20x 20x 20x 20x 20x 20x Green TB x 20 reps  Blue x 20 reps      TB push outs Blue 20x 20x 20x 20x 20x 20x Red x 20 reps  Red x 20 reps      blanca lo to hi  #8 20x 20x 20x --- #12 20x @#11 x 30 reps  ---     Blanca hi to lo  #10 20x 20x 20x --- #12 20x @#10 x 30 reps  ---     tricep dip   2x5 2x7 2x7 reps   keise tricep/biceps @#20 x 20 reps @#20 x 20 reps      Reverse pushup   2x5 2x7 2x7 reps   2x10 reps  ----     TB ER     Gr tubing x 20 reps  20x Gr tubing x 20 reps  Blue x 20 reps      Ther Activity       5# bicep curls w/ scap retractions 6# bicep curls w/ scap retractions x 20 reps      TG torso rotation (B)         L 10 x 20 reps      TG tricep dip         L 10 x 20 reps      Gait Training Modalities

## 2021-03-10 ENCOUNTER — OFFICE VISIT (OUTPATIENT)
Dept: UROLOGY | Facility: CLINIC | Age: 36
End: 2021-03-10
Payer: COMMERCIAL

## 2021-03-10 VITALS
WEIGHT: 230 LBS | BODY MASS INDEX: 31.15 KG/M2 | HEIGHT: 72 IN | HEART RATE: 70 BPM | SYSTOLIC BLOOD PRESSURE: 132 MMHG | DIASTOLIC BLOOD PRESSURE: 80 MMHG

## 2021-03-10 DIAGNOSIS — N50.812 PAIN IN LEFT TESTICLE: Primary | ICD-10-CM

## 2021-03-10 PROCEDURE — 1036F TOBACCO NON-USER: CPT | Performed by: PHYSICIAN ASSISTANT

## 2021-03-10 PROCEDURE — 99202 OFFICE O/P NEW SF 15 MIN: CPT | Performed by: PHYSICIAN ASSISTANT

## 2021-03-10 PROCEDURE — 3008F BODY MASS INDEX DOCD: CPT | Performed by: PHYSICIAN ASSISTANT

## 2021-03-11 ENCOUNTER — OFFICE VISIT (OUTPATIENT)
Dept: PHYSICAL THERAPY | Facility: CLINIC | Age: 36
End: 2021-03-11
Payer: COMMERCIAL

## 2021-03-11 DIAGNOSIS — M24.812 INTERNAL DERANGEMENT OF SHOULDER, LEFT: Primary | ICD-10-CM

## 2021-03-11 PROCEDURE — 97112 NEUROMUSCULAR REEDUCATION: CPT | Performed by: PHYSICAL THERAPIST

## 2021-03-11 PROCEDURE — 97110 THERAPEUTIC EXERCISES: CPT | Performed by: PHYSICAL THERAPIST

## 2021-03-11 NOTE — PROGRESS NOTES
Daily Note     Today's date: 3/11/2021  Patient name: Oren Romero  : 1985  MRN: [id number]  Referring provider: Kj Honeycutt PA-C  Dx:   Encounter Diagnosis     ICD-10-CM    1  Internal derangement of shoulder, left  M24 812                   Subjective: My shoulder feels great  Objective: See treatment diary below      Assessment: Tolerated treatment well  Patient demonstrated fatigue post treatment and exhibited good technique with therapeutic exercises      Plan: Continue per plan of care        Precautions: Left sh dislocation      Manuals                                                                 Neuro Re-Ed 2/8 2/11 2/16 2/19 2/23 2/25 3/4 3/9 3/11    Scapular stab w ball on wall 2x20 20x bosu 20x 20x 20x 20x 20/20 20/20 20/20    Neurac Scap stab w kneel 2x5 2x5 2x5 2x5 ---  -- ---     Neurac sh flex kneel 2x5 2x5 2x5 2x5 ---  -- ---     scap stabs on inverted BOSU    --- 20x 20x -- ---                                            Ther Ex           TB reese Blue 20x 20x 20x 20x 20x 20x Red x 20 reps  Red x 20 reps  20x    TB sh Ext Blue 20x 20x 20x 20x 20x 20x Green TB x 20 reps  Blue x 20 reps  20x    TB push outs Blue 20x 20x 20x 20x 20x 20x Red x 20 reps  Red x 20 reps  20x    blanca lo to hi  #8 20x 20x 20x --- #12 20x @#11 x 30 reps  --- #13 30x    Blanca hi to lo  #10 20x 20x 20x --- #12 20x @#10 x 30 reps  --- #11 30x    tricep dip   2x5 2x7 2x7 reps   keise tricep/biceps @#20 x 20 reps @#20 x 20 reps      Reverse pushup   2x5 2x7 2x7 reps   2x10 reps  ----     TB ER     Gr tubing x 20 reps  20x Gr tubing x 20 reps  Blue x 20 reps  20x    Ther Activity       5# bicep curls w/ scap retractions 6# bicep curls w/ scap retractions x 20 reps      TG torso rotation (B)         L 10 x 20 reps      TG tricep dip         L 10 x 20 reps      Gait Training                                       Modalities

## 2021-03-15 NOTE — PROGRESS NOTES
UROLOGY CONSULTATION NOTE     Patient Identifiers: Wellington Arriaza (MRN: [id number])  Service Requesting [address] [facility], [facility]   Service Providing Consultation:  Urology, Trev Fraga PA-C  Consults  Date of Service: 3/15/2021    Reason for Consultation:  Left testicular pain    History of Present Illness:     Wellington Arriaza is a 37 y o  male with no prior urologic problems is referred for evaluation of left orchalgia  His symptoms have almost 100% resolved at this point  He denies any direct trauma  Denies any swelling fever chills  He did has some tenderness early on which has now subsided  There is no testicular abnormality  He denies any family history of prostate bladder or kidney problems  ST eyes were checked and are negative  He was referred for evaluation of this problem  Past Medical, Past Surgical History:   History reviewed  No pertinent past medical history :    Past Surgical History:   Procedure Laterality Date    NO PAST SURGERIES     :    Medications, Allergies:   No current outpatient medications on file  Allergies:  No Known Allergies:    Social and Family History:   Social History:   Social History     Tobacco Use    Smoking status: Never Smoker    Smokeless tobacco: Never Used   Substance Use Topics    Alcohol use: Yes     Frequency: 2-4 times a month     Binge frequency: Less than monthly    Drug use: Never     Social History     Tobacco Use   Smoking Status Never Smoker   Smokeless Tobacco Never Used       Family History:  Family History   Problem Relation Age of Onset    No Known Problems Mother     No Known Problems Father    :     Review of Systems:     General: Fever, chills, or night sweats: negative  Cardiac: Negative for chest pain  Pulmonary: Negative for shortness of breath  Gastrointestinal: Abdominal pain negative  Nausea, vomiting, or diarrhea negative,  Genitourinary: See HPI above  Patient does not have hematuria    All other systems queried were negative  Physical Exam:   General: Patient is pleasant and in NAD  Awake and alert  /80   Pulse 70   Ht 6' (1 829 m)   Wt 104 kg (230 lb)   BMI 31 19 kg/m²   HEENT:  Conjunctiva are clear  Constitutional:  pleasant and cooperative     no apparent distress  Cardiac: Peripheral edema: negative  Pulmonary: Non-labored breathing  Abdomen: Soft, non-tender, non-distended  No surgical scars  No masses, tenderness, hernias noted  Genitourinary: Negative CVA tenderness, negative suprapubic tenderness  Extremities:  Neurological:CNII-XII intact  No numbness or tingling  Essentially non focal neurologic exam  Psychiatric:mood affect and behavior normal    (Male):  Normal  exam   Penis uncircumcised, phallus normal, meatus patent  Testicles descended into scrotum bilaterally without masses nor tenderness  No inguinal hernias bilaterally           Labs:     Lab Results   Component Value Date    HGB 14 1 03/05/2021    HCT 41 5 03/05/2021    WBC 4 8 03/05/2021     03/05/2021   ]    Lab Results   Component Value Date    K 4 4 03/05/2021     03/05/2021    CO2 23 03/05/2021    BUN 15 03/05/2021    CREATININE 0 94 03/05/2021   ]    Imaging:   I personally reviewed the images and report of the following studies, and reviewed them with the patient:    SCROTAL ULTRASOUND   IMPRESSION:     Testes appear symmetric and within normal limits  No suspicious or acute findings identified  ASSESSMENT:   1  Orchialgia      PLAN:   -all symptoms have resolved  -it is possible he had a incomplete intermittent torsion but there is no evidence of that  -I reviewed emergency room precautions with the patient  -he will call again if he has any recurrent symptoms      Thank you for allowing me to participate in this patients care  Please do not hesitate to call with any additional questions    Madie Adams PA-C

## 2021-03-16 ENCOUNTER — OFFICE VISIT (OUTPATIENT)
Dept: PHYSICAL THERAPY | Facility: CLINIC | Age: 36
End: 2021-03-16
Payer: COMMERCIAL

## 2021-03-16 DIAGNOSIS — M24.812 INTERNAL DERANGEMENT OF SHOULDER, LEFT: Primary | ICD-10-CM

## 2021-03-16 PROCEDURE — 97110 THERAPEUTIC EXERCISES: CPT

## 2021-03-16 PROCEDURE — 97112 NEUROMUSCULAR REEDUCATION: CPT

## 2021-03-16 NOTE — PROGRESS NOTES
Daily Note     Today's date: 3/16/2021  Patient name: Cassius Butterfield  : 1985  MRN: [id number]  Referring provider: Frannie Huerta PA-C  Dx:   Encounter Diagnosis     ICD-10-CM    1  Internal derangement of shoulder, left  M24 812        Start Time: 1640  Stop Time: 1710  Total time in clinic (min): 30 minutes    Subjective: My shoulder feels great  Objective: See treatment diary below      Assessment: Tolerated treatment well   Patient exhibited good technique with therapeutic exercises      Plan: D/C with HEP     Precautions: Left sh dislocation      Manuals                                                                 Neuro Re-Ed 2/8 2/11 2/16 2/19 2/23 2/25 3/4 3/9 3/16    Scapular stab w ball on wall 2x20 20x bosu 20x 20x 20x 20x 20/20 20/20 20/20    Neurac Scap stab w kneel 2x5 2x5 2x5 2x5 ---  -- --- ---    Neurac sh flex kneel 2x5 2x5 2x5 2x5 ---  -- --- ---    scap stabs on inverted BOSU    --- 20x 20x -- --- ---                                           Ther Ex           TB reese Blue 20x 20x 20x 20x 20x 20x Red x 20 reps  Red x 20 reps  red 20x    TB sh Ext Blue 20x 20x 20x 20x 20x 20x Green TB x 20 reps  Blue x 20 reps  blue 20x    TB push outs Blue 20x 20x 20x 20x 20x 20x Red x 20 reps  Red x 20 reps  red 20x    blanca lo to hi  #8 20x 20x 20x --- #12 20x @#11 x 30 reps  --- @#13 30x    Blanca hi to lo  #10 20x 20x 20x --- #12 20x @#10 x 30 reps  --- @#11 30x    tricep dip   2x5 2x7 2x7 reps   keise tricep/biceps @#20 x 20 reps @#20 x 20 reps  @#20 x 20 reps     Reverse pushup   2x5 2x7 2x7 reps   2x10 reps  ---- ---    TB ER     Gr tubing x 20 reps  20x Gr tubing x 20 reps  Blue x 20 reps  blue 20x    Ther Activity       5# bicep curls w/ scap retractions 6# bicep curls w/ scap retractions x 20 reps  ---    TG torso rotation (B)         L 10 x 20 reps  --    TG tricep dip         L 10 x 20 reps  ---    Gait Training                                       Modalities

## 2021-03-18 ENCOUNTER — APPOINTMENT (OUTPATIENT)
Dept: PHYSICAL THERAPY | Facility: CLINIC | Age: 36
End: 2021-03-18
Payer: COMMERCIAL

## 2021-04-13 ENCOUNTER — OFFICE VISIT (OUTPATIENT)
Dept: FAMILY MEDICINE CLINIC | Facility: CLINIC | Age: 36
End: 2021-04-13
Payer: COMMERCIAL

## 2021-04-13 VITALS
HEIGHT: 74 IN | RESPIRATION RATE: 18 BRPM | HEART RATE: 65 BPM | DIASTOLIC BLOOD PRESSURE: 72 MMHG | TEMPERATURE: 98 F | WEIGHT: 229 LBS | BODY MASS INDEX: 29.39 KG/M2 | OXYGEN SATURATION: 98 % | SYSTOLIC BLOOD PRESSURE: 122 MMHG

## 2021-04-13 DIAGNOSIS — E78.2 MIXED HYPERLIPIDEMIA: ICD-10-CM

## 2021-04-13 DIAGNOSIS — N26.1 ATROPHY OF RIGHT KIDNEY: ICD-10-CM

## 2021-04-13 DIAGNOSIS — Z00.00 ROUTINE ADULT HEALTH MAINTENANCE: Primary | ICD-10-CM

## 2021-04-13 PROCEDURE — 3008F BODY MASS INDEX DOCD: CPT | Performed by: NURSE PRACTITIONER

## 2021-04-13 PROCEDURE — 3725F SCREEN DEPRESSION PERFORMED: CPT | Performed by: NURSE PRACTITIONER

## 2021-04-13 PROCEDURE — 1036F TOBACCO NON-USER: CPT | Performed by: NURSE PRACTITIONER

## 2021-04-13 PROCEDURE — 99395 PREV VISIT EST AGE 18-39: CPT | Performed by: NURSE PRACTITIONER

## 2021-04-13 NOTE — PROGRESS NOTES
FAMILY PRACTICE HEALTH MAINTENANCE OFFICE VISIT  [facility]    NAME: Hoda Ramírez  AGE: 37 y o  SEX: male  : 1985     DATE: 2021    Assessment and Plan     1  Routine adult health maintenance    2  Atrophy of right kidney  Comments:  kidney function is normal and will repeat in 6 months  Orders:  -     Comprehensive metabolic panel; Future    3  Mixed hyperlipidemia  Comments:  diet/excercise/weight loss advised and will repeat blood work in 6 months  Orders:  -     Lipid Panel with Direct LDL reflex; Future        · Patient Counseling:   · Nutrition: Stressed importance of a well balanced diet, moderation of sodium/saturated fat, caloric balance and sufficient intake of fiber  · Exercise: Stressed the importance of regular exercise with a goal of 150 minutes per week  · Dental Health: Discussed daily flossing and brushing and regular dental visits   · Sexuality: Discussed sexually transmitted infections, use of condoms and prevention of unintended pregnancy  · Alcohol Use:  Recommended moderation of alcohol intake  · Injury Prevention: Discussed Safety Belts, Safety Helmets, and Smoke Detectors    · Immunizations reviewed: Risks and Benefits discussed, Declined recommended vaccinations and refused Tdap  · Discussed benefits of:  Screening labs   BMI Counseling: Body mass index is 29 4 kg/m²  Discussed with patient's BMI with him  The BMI is above normal  Nutrition recommendations include reducing portion sizes, decreasing overall calorie intake, 3-5 servings of fruits/vegetables daily, reducing fast food intake, consuming healthier snacks, decreasing soda and/or juice intake, moderation in carbohydrate intake, increasing intake of lean protein, reducing intake of saturated fat and trans fat and reducing intake of cholesterol  Exercise recommendations include exercising 3-5 times per week, joining a gym and strength training exercises      Return in about 1 year (around 4/13/2022) for Annual physical         Chief Complaint     Chief Complaint   Patient presents with    Physical Exam     [id number]       History of Present Illness     HPI    Well Adult Physical   Patient here for a comprehensive physical exam   Denies any concerns and complains  Denies any family h/o colon / breast/ pancreatic/ prostate cancer  Had follow up with urologist for testicular pain but no issues found and pain has been resolved  Has h/o atrophy of right kidney and will do CMP every 6 months  Blood work discussed  The nature of cardiac risk has been fully discussed with this patient  I have made him aware of his LDL target goal given his cardiovascular risk analysis  I have discussed the appropriate diet  The need for lifelong compliance in order to reduce risk is stressed  A regular exercise program is recommended to help achieve and maintain normal body weight, fitness and improve lipid balance  Diet and Physical Activity  Diet: well balanced diet  Exercise: occasionally      Depression Screen  PHQ-9 Depression Screening    PHQ-9:   Frequency of the following problems over the past two weeks:      Little interest or pleasure in doing things: 0 - not at all  Feeling down, depressed, or hopeless: 0 - not at all  PHQ-2 Score: 0          General Health  Hearing: Normal:  bilateral  Vision: most recent eye exam <1 year and wears glasses  Dental: regular dental visits    Reproductive Health  Denies nocturia and Monthly self testicular exams recommended      The following portions of the patient's history were reviewed and updated as appropriate: allergies, current medications, past family history, past medical history, past social history, past surgical history and problem list     Review of Systems     Review of Systems   Constitutional: Negative  HENT: Negative  Eyes: Negative  Respiratory: Negative  Cardiovascular: Negative  Gastrointestinal: Negative      Endocrine: Negative  Genitourinary: Negative  Musculoskeletal: Negative  Skin: Negative  Allergic/Immunologic: Negative  Neurological: Negative  Hematological: Negative  Psychiatric/Behavioral: Negative  Past Medical History     History reviewed  No pertinent past medical history  Past Surgical History     Past Surgical History:   Procedure Laterality Date    NO PAST SURGERIES         Social History     Social History     Socioeconomic History    Marital status: Single     Spouse name: None    Number of children: None    Years of education: None    Highest education level: None   Occupational History    None   Social Needs    Financial resource strain: None    Food insecurity     Worry: None     Inability: None    Transportation needs     Medical: None     Non-medical: None   Tobacco Use    Smoking status: Never Smoker    Smokeless tobacco: Never Used   Substance and Sexual Activity    Alcohol use: Yes     Frequency: Monthly or less     Binge frequency: Less than monthly    Drug use: Never    Sexual activity: None   Lifestyle    Physical activity     Days per week: None     Minutes per session: None    Stress: None   Relationships    Social connections     Talks on phone: None     Gets together: None     Attends [employer] service: None     Active member of club or organization: None     Attends meetings of clubs or organizations: None     Relationship status: None    Intimate partner violence     Fear of current or ex partner: None     Emotionally abused: None     Physically abused: None     Forced sexual activity: None   Other Topics Concern    None   Social History Narrative    None       Family History     Family History   Problem Relation Age of Onset    No Known Problems Mother     No Known Problems Father        Current Medications     No current outpatient medications on file       Allergies     No Known Allergies    Objective     /72   Pulse 65   Temp 98 °F (36 7 °C)   Resp 18   Ht 6' 2" (1 88 m)   Wt 104 kg (229 lb)   SpO2 98%   BMI 29 40 kg/m²      Physical Exam  Vitals signs reviewed  Constitutional:       Appearance: Normal appearance  He is well-developed  HENT:      Head: Normocephalic  Right Ear: Tympanic membrane, ear canal and external ear normal       Left Ear: Tympanic membrane, ear canal and external ear normal       Nose: Nose normal       Right Sinus: No maxillary sinus tenderness or frontal sinus tenderness  Left Sinus: No maxillary sinus tenderness or frontal sinus tenderness  Mouth/Throat:      Lips: Pink  Mouth: Mucous membranes are moist       Dentition: Normal dentition  No gingival swelling  Pharynx: No pharyngeal swelling, oropharyngeal exudate or posterior oropharyngeal erythema  Eyes:      General: Lids are normal       Conjunctiva/sclera: Conjunctivae normal       Pupils: Pupils are equal, round, and reactive to light  Neck:      Musculoskeletal: Full passive range of motion without pain, normal range of motion and neck supple  Thyroid: No thyromegaly  Vascular: No carotid bruit  Cardiovascular:      Rate and Rhythm: Normal rate and regular rhythm  Pulses:           Radial pulses are 2+ on the right side and 2+ on the left side  Femoral pulses are 2+ on the right side and 2+ on the left side  Dorsalis pedis pulses are 2+ on the right side and 2+ on the left side  Posterior tibial pulses are 2+ on the right side and 2+ on the left side  Heart sounds: Normal heart sounds  No murmur  Pulmonary:      Effort: Pulmonary effort is normal       Breath sounds: Normal breath sounds  Chest:      Breasts:         Right: No swelling, mass, nipple discharge, skin change or tenderness  Left: No swelling, mass, nipple discharge, skin change or tenderness  Abdominal:      General: Bowel sounds are normal       Palpations: Abdomen is soft  There is no hepatomegaly  Tenderness: There is no abdominal tenderness  There is no rebound  Hernia: No hernia is present  There is no hernia in the ventral area, left inguinal area or right inguinal area  Musculoskeletal: Normal range of motion  General: No tenderness or deformity  Lymphadenopathy:      Cervical:      Right cervical: No superficial or posterior cervical adenopathy  Left cervical: No superficial or posterior cervical adenopathy  Upper Body:      Right upper body: No supraclavicular or axillary adenopathy  Left upper body: No supraclavicular or axillary adenopathy  Lower Body: No right inguinal adenopathy  No left inguinal adenopathy  Skin:     General: Skin is warm and dry  Neurological:      Mental Status: He is alert and oriented to person, place, and time  GCS: GCS eye subscore is 4  GCS verbal subscore is 5  GCS motor subscore is 6  Sensory: No sensory deficit  Coordination: Coordination normal       Gait: Gait normal       Deep Tendon Reflexes: Reflexes are normal and symmetric  Psychiatric:         Speech: Speech normal          Behavior: Behavior normal          Thought Content:  Thought content normal          Judgment: Judgment normal             Visual Acuity Screening    Right eye Left eye Both eyes   Without correction: 20/25 20/15 20/15   With correction:              [facility], [address] [place] [address]

## 2021-04-13 NOTE — PATIENT INSTRUCTIONS
Wellness Visit for Adults   WHAT YOU NEED TO KNOW:   What is a wellness visit? A wellness visit is when you see your healthcare provider to get screened for health problems  Your healthcare provider will also give you advice on how to stay healthy  Write down your questions so you remember to ask them  Ask your healthcare provider how often you should have a wellness visit  What happens at a wellness visit? Your healthcare provider will ask about your health, and your family history of health problems  This includes high blood pressure, heart disease, and cancer  He or she will ask if you have symptoms that concern you, if you smoke, and about your mood  You may also be asked about your intake of medicines, supplements, food, and alcohol  Any of the following may be done:  · Your weight  will be checked  Your height may also be checked so your body mass index (BMI) can be calculated  Your BMI shows if you are at a healthy weight  · Your blood pressure  and heart rate will be checked  Your temperature may also be checked  · Blood and urine tests  may be done  Blood tests may be done to check your cholesterol levels  Abnormal cholesterol levels increase your risk for heart disease and stroke  You may also need a blood or urine test to check for diabetes if you are at increased risk  Urine tests may be done to look for signs of an infection or kidney disease  · A physical exam  includes checking your heartbeat and lungs with a stethoscope  Your healthcare provider may also check your skin to look for sun damage  · Screening tests  may be recommended  A screening test is done to check for diseases that may not cause symptoms  The screening tests you may need depend on your age, gender, family history, and lifestyle habits  For example, colorectal screening may be recommended if you are 48years old or older  What screening tests do I need if I am a woman?    · A Pap smear  is used to screen for cervical cancer  Pap smears are usually done every 3 to 5 years depending on your age  You may need them more often if you have had abnormal Pap smear test results in the past  Ask your healthcare provider how often you should have a Pap smear  · A mammogram  is an x-ray of your breasts to screen for breast cancer  Experts recommend mammograms every 2 years starting at age 48 years  You may need a mammogram at age 52 years or younger if you have an increased risk for breast cancer  Talk to your healthcare provider about when you should start having mammograms and how often you need them  What vaccines might I need? · Get an influenza vaccine  every year  The influenza vaccine protects you from the flu  Several types of viruses cause the flu  The viruses change over time, so new vaccines are made each year  · Get a tetanus-diphtheria (Td) booster vaccine  every 10 years  This vaccine protects you against tetanus and diphtheria  Tetanus is a severe infection that may cause painful muscle spasms and lockjaw  Diphtheria is a severe bacterial infection that causes a thick covering in the back of your mouth and throat  · Get a human papillomavirus (HPV) vaccine  if you are female and aged 23 to 32 or male 23 to 24 and never received it  This vaccine protects you from HPV infection  HPV is the most common infection spread by sexual contact  HPV may also cause vaginal, penile, and anal cancers  · Get a pneumococcal vaccine  if you are aged 72 years or older  The pneumococcal vaccine is an injection given to protect you from pneumococcal disease  Pneumococcal disease is an infection caused by pneumococcal bacteria  The infection may cause pneumonia, meningitis, or an ear infection  · Get a shingles vaccine  if you are 60 or older, even if you have had shingles before  The shingles vaccine is an injection to protect you from the varicella-zoster virus  This is the same virus that causes chickenpox   Shingles is a painful rash that develops in people who had chickenpox or have been exposed to the virus  How can I eat healthy? My Plate is a model for planning healthy meals  It shows the types and amounts of foods that should go on your plate  Fruits and vegetables make up about half of your plate, and grains and protein make up the other half  A serving of dairy is included on the side of your plate  The amount of calories and serving sizes you need depends on your age, gender, weight, and height  Examples of healthy foods are listed below:  · Eat a variety of vegetables  such as dark green, red, and orange vegetables  You can also include canned vegetables low in sodium (salt) and frozen vegetables without added butter or sauces  · Eat a variety of fresh fruits , canned fruit in 100% juice, frozen fruit, and dried fruit  · Include whole grains  At least half of the grains you eat should be whole grains  Examples include whole-wheat bread, wheat pasta, brown rice, and whole-grain cereals such as oatmeal     · Eat a variety of protein foods such as seafood (fish and shellfish), lean meat, and poultry without skin (turkey and chicken)  Examples of lean meats include pork leg, shoulder, or tenderloin, and beef round, sirloin, tenderloin, and extra lean ground beef  Other protein foods include eggs and egg substitutes, beans, peas, soy products, nuts, and seeds  · Choose low-fat dairy products such as skim or 1% milk or low-fat yogurt, cheese, and cottage cheese  · Limit unhealthy fats  such as butter, hard margarine, and shortening  How much exercise do I need? Exercise at least 30 minutes per day on most days of the week  Some examples of exercise include walking, biking, dancing, and swimming  You can also fit in more physical activity by taking the stairs instead of the elevator or parking farther away from stores  Include muscle strengthening activities 2 days each week   Regular exercise provides many health benefits  It helps you manage your weight, and decreases your risk for type 2 diabetes, heart disease, stroke, and high blood pressure  Exercise can also help improve your mood  Ask your healthcare provider about the best exercise plan for you  What are some general health and safety guidelines I should follow? · Do not smoke  Nicotine and other chemicals in cigarettes and cigars can cause lung damage  Ask your healthcare provider for information if you currently smoke and need help to quit  E-cigarettes or smokeless tobacco still contain nicotine  Talk to your healthcare provider before you use these products  · Limit alcohol  A drink of alcohol is 12 ounces of beer, 5 ounces of wine, or 1½ ounces of liquor  · Lose weight, if needed  Being overweight increases your risk of certain health conditions  These include heart disease, high blood pressure, type 2 diabetes, and certain types of cancer  · Protect your skin  Do not sunbathe or use tanning beds  Use sunscreen with a SPF 15 or higher  Apply sunscreen at least 15 minutes before you go outside  Reapply sunscreen every 2 hours  Wear protective clothing, hats, and sunglasses when you are outside  · Drive safely  Always wear your seatbelt  Make sure everyone in your car wears a seatbelt  A seatbelt can save your life if you are in an accident  Do not use your cell phone when you are driving  This could distract you and cause an accident  Pull over if you need to make a call or send a text message  · Practice safe sex  Use latex condoms if are sexually active and have more than one partner  Your healthcare provider may recommend screening tests for sexually transmitted infections (STIs)  · Wear helmets, lifejackets, and protective gear  Always wear a helmet when you ride a bike or motorcycle, go skiing, or play sports that could cause a head injury  Wear protective equipment when you play sports   Wear a lifejacket when you are on a boat or doing water sports  CARE AGREEMENT:   You have the right to help plan your care  Learn about your health condition and how it may be treated  Discuss treatment options with your healthcare providers to decide what care you want to receive  You always have the right to refuse treatment  The above information is an  only  It is not intended as medical advice for individual conditions or treatments  Talk to your doctor, nurse or pharmacist before following any medical regimen to see if it is safe and effective for you  © Copyright [address] Information is for End User's use only and may not be sold, redistributed or otherwise used for commercial purposes  All illustrations and images included in CareNotes® are the copyrighted property of [employer] M [employer]  or [employer] Visit for Adults   WHAT [place]:   What is a wellness visit? A wellness visit is when you see your healthcare provider to get screened for health problems  Your healthcare provider will also give you advice on how to stay healthy  Write down your questions so you remember to ask them  Ask your healthcare provider how often you should have a wellness visit  What happens at a wellness visit? Your healthcare provider will ask about your health, and your family history of health problems  This includes high blood pressure, heart disease, and cancer  He or she will ask if you have symptoms that concern you, if you smoke, and about your mood  You may also be asked about your intake of medicines, supplements, food, and alcohol  Any of the following may be done:  · Your weight  will be checked  Your height may also be checked so your body mass index (BMI) can be calculated  Your BMI shows if you are at a healthy weight  · Your blood pressure  and heart rate will be checked  Your temperature may also be checked  · Blood and urine tests  may be done   Blood tests may be done to check your cholesterol levels  Abnormal cholesterol levels increase your risk for heart disease and stroke  You may also need a blood or urine test to check for diabetes if you are at increased risk  Urine tests may be done to look for signs of an infection or kidney disease  · A physical exam  includes checking your heartbeat and lungs with a stethoscope  Your healthcare provider may also check your skin to look for sun damage  · Screening tests  may be recommended  A screening test is done to check for diseases that may not cause symptoms  The screening tests you may need depend on your age, gender, family history, and lifestyle habits  For example, colorectal screening may be recommended if you are 48years old or older  What screening tests do I need if I am a woman? · A Pap smear  is used to screen for cervical cancer  Pap smears are usually done every 3 to 5 years depending on your age  You may need them more often if you have had abnormal Pap smear test results in the past  Ask your healthcare provider how often you should have a Pap smear  · A mammogram  is an x-ray of your breasts to screen for breast cancer  Experts recommend mammograms every 2 years starting at age 48 years  You may need a mammogram at age 52 years or younger if you have an increased risk for breast cancer  Talk to your healthcare provider about when you should start having mammograms and how often you need them  What vaccines might I need? · Get an influenza vaccine  every year  The influenza vaccine protects you from the flu  Several types of viruses cause the flu  The viruses change over time, so new vaccines are made each year  · Get a tetanus-diphtheria (Td) booster vaccine  every 10 years  This vaccine protects you against tetanus and diphtheria  Tetanus is a severe infection that may cause painful muscle spasms and lockjaw   Diphtheria is a severe bacterial infection that causes a thick covering in the back of your mouth and throat  · Get a human papillomavirus (HPV) vaccine  if you are female and aged 23 to 32 or male 23 to 24 and never received it  This vaccine protects you from HPV infection  HPV is the most common infection spread by sexual contact  HPV may also cause vaginal, penile, and anal cancers  · Get a pneumococcal vaccine  if you are aged 72 years or older  The pneumococcal vaccine is an injection given to protect you from pneumococcal disease  Pneumococcal disease is an infection caused by pneumococcal bacteria  The infection may cause pneumonia, meningitis, or an ear infection  · Get a shingles vaccine  if you are 60 or older, even if you have had shingles before  The shingles vaccine is an injection to protect you from the varicella-zoster virus  This is the same virus that causes chickenpox  Shingles is a painful rash that develops in people who had chickenpox or have been exposed to the virus  How can I eat healthy? My Plate is a model for planning healthy meals  It shows the types and amounts of foods that should go on your plate  Fruits and vegetables make up about half of your plate, and grains and protein make up the other half  A serving of dairy is included on the side of your plate  The amount of calories and serving sizes you need depends on your age, gender, weight, and height  Examples of healthy foods are listed below:  · Eat a variety of vegetables  such as dark green, red, and orange vegetables  You can also include canned vegetables low in sodium (salt) and frozen vegetables without added butter or sauces  · Eat a variety of fresh fruits , canned fruit in 100% juice, frozen fruit, and dried fruit  · Include whole grains  At least half of the grains you eat should be whole grains   Examples include whole-wheat bread, wheat pasta, brown rice, and whole-grain cereals such as oatmeal     · Eat a variety of protein foods such as seafood (fish and shellfish), lean meat, and poultry without skin (turkey and chicken)  Examples of lean meats include pork leg, shoulder, or tenderloin, and beef round, sirloin, tenderloin, and extra lean ground beef  Other protein foods include eggs and egg substitutes, beans, peas, soy products, nuts, and seeds  · Choose low-fat dairy products such as skim or 1% milk or low-fat yogurt, cheese, and cottage cheese  · Limit unhealthy fats  such as butter, hard margarine, and shortening  How much exercise do I need? Exercise at least 30 minutes per day on most days of the week  Some examples of exercise include walking, biking, dancing, and swimming  You can also fit in more physical activity by taking the stairs instead of the elevator or parking farther away from stores  Include muscle strengthening activities 2 days each week  Regular exercise provides many health benefits  It helps you manage your weight, and decreases your risk for type 2 diabetes, heart disease, stroke, and high blood pressure  Exercise can also help improve your mood  Ask your healthcare provider about the best exercise plan for you  What are some general health and safety guidelines I should follow? · Do not smoke  Nicotine and other chemicals in cigarettes and cigars can cause lung damage  Ask your healthcare provider for information if you currently smoke and need help to quit  E-cigarettes or smokeless tobacco still contain nicotine  Talk to your healthcare provider before you use these products  · Limit alcohol  A drink of alcohol is 12 ounces of beer, 5 ounces of wine, or 1½ ounces of liquor  · Lose weight, if needed  Being overweight increases your risk of certain health conditions  These include heart disease, high blood pressure, type 2 diabetes, and certain types of cancer  · Protect your skin  Do not sunbathe or use tanning beds  Use sunscreen with a SPF 15 or higher  Apply sunscreen at least 15 minutes before you go outside   Reapply sunscreen every 2 hours  Wear protective clothing, hats, and sunglasses when you are outside  · Drive safely  Always wear your seatbelt  Make sure everyone in your car wears a seatbelt  A seatbelt can save your life if you are in an accident  Do not use your cell phone when you are driving  This could distract you and cause an accident  Pull over if you need to make a call or send a text message  · Practice safe sex  Use latex condoms if are sexually active and have more than one partner  Your healthcare provider may recommend screening tests for sexually transmitted infections (STIs)  · Wear helmets, lifejackets, and protective gear  Always wear a helmet when you ride a bike or motorcycle, go skiing, or play sports that could cause a head injury  Wear protective equipment when you play sports  Wear a lifejacket when you are on a boat or doing water sports  CARE AGREEMENT:   You have the right to help plan your care  Learn about your health condition and how it may be treated  Discuss treatment options with your healthcare providers to decide what care you want to receive  You always have the right to refuse treatment  The above information is an  only  It is not intended as medical advice for individual conditions or treatments  Talk to your doctor, nurse or pharmacist before following any medical regimen to see if it is safe and effective for you  © Copyright [place] Information is for End User's use only and may not be sold, redistributed or otherwise used for commercial purposes  All illustrations and images included in CareNotes® are the copyrighted property of [employer] M [employer] Inc  or [employer] Visit for Adults   WHAT [place]:   What is a wellness visit? A wellness visit is when you see your healthcare provider to get screened for health problems  Your healthcare provider will also give you advice on how to stay healthy   Write down your questions so you remember to ask them  Ask your healthcare provider how often you should have a wellness visit  What happens at a wellness visit? Your healthcare provider will ask about your health, and your family history of health problems  This includes high blood pressure, heart disease, and cancer  He or she will ask if you have symptoms that concern you, if you smoke, and about your mood  You may also be asked about your intake of medicines, supplements, food, and alcohol  Any of the following may be done:  · Your weight  will be checked  Your height may also be checked so your body mass index (BMI) can be calculated  Your BMI shows if you are at a healthy weight  · Your blood pressure  and heart rate will be checked  Your temperature may also be checked  · Blood and urine tests  may be done  Blood tests may be done to check your cholesterol levels  Abnormal cholesterol levels increase your risk for heart disease and stroke  You may also need a blood or urine test to check for diabetes if you are at increased risk  Urine tests may be done to look for signs of an infection or kidney disease  · A physical exam  includes checking your heartbeat and lungs with a stethoscope  Your healthcare provider may also check your skin to look for sun damage  · Screening tests  may be recommended  A screening test is done to check for diseases that may not cause symptoms  The screening tests you may need depend on your age, gender, family history, and lifestyle habits  For example, colorectal screening may be recommended if you are 48years old or older  What screening tests do I need if I am a woman? · A Pap smear  is used to screen for cervical cancer  Pap smears are usually done every 3 to 5 years depending on your age  You may need them more often if you have had abnormal Pap smear test results in the past  Ask your healthcare provider how often you should have a Pap smear      · A mammogram  is an x-ray of your breasts to screen for breast cancer  Experts recommend mammograms every 2 years starting at age 48 years  You may need a mammogram at age 52 years or younger if you have an increased risk for breast cancer  Talk to your healthcare provider about when you should start having mammograms and how often you need them  What vaccines might I need? · Get an influenza vaccine  every year  The influenza vaccine protects you from the flu  Several types of viruses cause the flu  The viruses change over time, so new vaccines are made each year  · Get a tetanus-diphtheria (Td) booster vaccine  every 10 years  This vaccine protects you against tetanus and diphtheria  Tetanus is a severe infection that may cause painful muscle spasms and lockjaw  Diphtheria is a severe bacterial infection that causes a thick covering in the back of your mouth and throat  · Get a human papillomavirus (HPV) vaccine  if you are female and aged 23 to 32 or male 23 to 24 and never received it  This vaccine protects you from HPV infection  HPV is the most common infection spread by sexual contact  HPV may also cause vaginal, penile, and anal cancers  · Get a pneumococcal vaccine  if you are aged 72 years or older  The pneumococcal vaccine is an injection given to protect you from pneumococcal disease  Pneumococcal disease is an infection caused by pneumococcal bacteria  The infection may cause pneumonia, meningitis, or an ear infection  · Get a shingles vaccine  if you are 60 or older, even if you have had shingles before  The shingles vaccine is an injection to protect you from the varicella-zoster virus  This is the same virus that causes chickenpox  Shingles is a painful rash that develops in people who had chickenpox or have been exposed to the virus  How can I eat healthy? My Plate is a model for planning healthy meals  It shows the types and amounts of foods that should go on your plate   Fruits and vegetables make up about half of your plate, and grains and protein make up the other half  A serving of dairy is included on the side of your plate  The amount of calories and serving sizes you need depends on your age, gender, weight, and height  Examples of healthy foods are listed below:  · Eat a variety of vegetables  such as dark green, red, and orange vegetables  You can also include canned vegetables low in sodium (salt) and frozen vegetables without added butter or sauces  · Eat a variety of fresh fruits , canned fruit in 100% juice, frozen fruit, and dried fruit  · Include whole grains  At least half of the grains you eat should be whole grains  Examples include whole-wheat bread, wheat pasta, brown rice, and whole-grain cereals such as oatmeal     · Eat a variety of protein foods such as seafood (fish and shellfish), lean meat, and poultry without skin (turkey and chicken)  Examples of lean meats include pork leg, shoulder, or tenderloin, and beef round, sirloin, tenderloin, and extra lean ground beef  Other protein foods include eggs and egg substitutes, beans, peas, soy products, nuts, and seeds  · Choose low-fat dairy products such as skim or 1% milk or low-fat yogurt, cheese, and cottage cheese  · Limit unhealthy fats  such as butter, hard margarine, and shortening  How much exercise do I need? Exercise at least 30 minutes per day on most days of the week  Some examples of exercise include walking, biking, dancing, and swimming  You can also fit in more physical activity by taking the stairs instead of the elevator or parking farther away from stores  Include muscle strengthening activities 2 days each week  Regular exercise provides many health benefits  It helps you manage your weight, and decreases your risk for type 2 diabetes, heart disease, stroke, and high blood pressure  Exercise can also help improve your mood  Ask your healthcare provider about the best exercise plan for you       What are some general health and safety guidelines I should follow? · Do not smoke  Nicotine and other chemicals in cigarettes and cigars can cause lung damage  Ask your healthcare provider for information if you currently smoke and need help to quit  E-cigarettes or smokeless tobacco still contain nicotine  Talk to your healthcare provider before you use these products  · Limit alcohol  A drink of alcohol is 12 ounces of beer, 5 ounces of wine, or 1½ ounces of liquor  · Lose weight, if needed  Being overweight increases your risk of certain health conditions  These include heart disease, high blood pressure, type 2 diabetes, and certain types of cancer  · Protect your skin  Do not sunbathe or use tanning beds  Use sunscreen with a SPF 15 or higher  Apply sunscreen at least 15 minutes before you go outside  Reapply sunscreen every 2 hours  Wear protective clothing, hats, and sunglasses when you are outside  · Drive safely  Always wear your seatbelt  Make sure everyone in your car wears a seatbelt  A seatbelt can save your life if you are in an accident  Do not use your cell phone when you are driving  This could distract you and cause an accident  Pull over if you need to make a call or send a text message  · Practice safe sex  Use latex condoms if are sexually active and have more than one partner  Your healthcare provider may recommend screening tests for sexually transmitted infections (STIs)  · Wear helmets, lifejackets, and protective gear  Always wear a helmet when you ride a bike or motorcycle, go skiing, or play sports that could cause a head injury  Wear protective equipment when you play sports  Wear a lifejacket when you are on a boat or doing water sports  CARE AGREEMENT:   You have the right to help plan your care  Learn about your health condition and how it may be treated  Discuss treatment options with your healthcare providers to decide what care you want to receive   You always have the right to refuse treatment  The above information is an  only  It is not intended as medical advice for individual conditions or treatments  Talk to your doctor, nurse or pharmacist before following any medical regimen to see if it is safe and effective for you  © Copyright [address] Information is for End User's use only and may not be sold, redistributed or otherwise used for commercial purposes   All illustrations and images included in CareNotes® are the copyrighted property of [employer]  or [address] [place] [address]

## 2021-11-17 ENCOUNTER — TELEPHONE (OUTPATIENT)
Dept: FAMILY MEDICINE CLINIC | Facility: CLINIC | Age: 36
End: 2021-11-17

## 2021-12-28 LAB
ALBUMIN SERPL-MCNC: 4.4 G/DL (ref 4–5)
ALBUMIN/GLOB SERPL: 1.9 {RATIO} (ref 1.2–2.2)
ALP SERPL-CCNC: 64 IU/L (ref 44–121)
ALT SERPL-CCNC: 28 IU/L (ref 0–44)
AST SERPL-CCNC: 21 IU/L (ref 0–40)
BILIRUB SERPL-MCNC: 0.2 MG/DL (ref 0–1.2)
BUN SERPL-MCNC: 14 MG/DL (ref 6–20)
BUN/CREAT SERPL: 15 (ref 9–20)
CALCIUM SERPL-MCNC: 8.8 MG/DL (ref 8.7–10.2)
CHLORIDE SERPL-SCNC: 104 MMOL/L (ref 96–106)
CHOLEST SERPL-MCNC: 186 MG/DL (ref 100–199)
CO2 SERPL-SCNC: 23 MMOL/L (ref 20–29)
CREAT SERPL-MCNC: 0.96 MG/DL (ref 0.76–1.27)
GLOBULIN SER-MCNC: 2.3 G/DL (ref 1.5–4.5)
GLUCOSE SERPL-MCNC: 87 MG/DL (ref 65–99)
HDLC SERPL-MCNC: 34 MG/DL
LDLC SERPL CALC-MCNC: 129 MG/DL (ref 0–99)
MICRODELETION SYND BLD/T FISH: NORMAL
POTASSIUM SERPL-SCNC: 4.5 MMOL/L (ref 3.5–5.2)
PROT SERPL-MCNC: 6.7 G/DL (ref 6–8.5)
SL AMB EGFR AFRICAN AMERICAN: 119 ML/MIN/1.73
SL AMB EGFR NON AFRICAN AMERICAN: 103 ML/MIN/1.73
SL AMB VLDL CHOLESTEROL CALC: 23 MG/DL (ref 5–40)
SODIUM SERPL-SCNC: 139 MMOL/L (ref 134–144)
TRIGL SERPL-MCNC: 129 MG/DL (ref 0–149)

## 2021-12-30 ENCOUNTER — TELEPHONE (OUTPATIENT)
Dept: FAMILY MEDICINE CLINIC | Facility: CLINIC | Age: 36
End: 2021-12-30

## 2021-12-30 NOTE — TELEPHONE ENCOUNTER
[phone] Aroldo is calling Lyndon Michael from yesterday  I do not see a phone message, he stated it was because of his bloodwork?

## 2022-11-18 ENCOUNTER — TELEPHONE (OUTPATIENT)
Dept: FAMILY MEDICINE CLINIC | Facility: CLINIC | Age: 37
End: 2022-11-18

## 2022-11-23 NOTE — TELEPHONE ENCOUNTER
11/23/22 1:23 PM        The office's request has been received, reviewed, and the patient chart updated  The PCP has successfully been removed with a patient attribution note  This message will now be completed          Thank you  Ivan Humza